# Patient Record
Sex: MALE | Race: WHITE | NOT HISPANIC OR LATINO
[De-identification: names, ages, dates, MRNs, and addresses within clinical notes are randomized per-mention and may not be internally consistent; named-entity substitution may affect disease eponyms.]

---

## 2017-01-17 ENCOUNTER — FORM ENCOUNTER (OUTPATIENT)
Age: 27
End: 2017-01-17

## 2017-01-18 ENCOUNTER — OUTPATIENT (OUTPATIENT)
Dept: OUTPATIENT SERVICES | Facility: HOSPITAL | Age: 27
LOS: 1 days | End: 2017-01-18

## 2017-01-18 ENCOUNTER — APPOINTMENT (OUTPATIENT)
Dept: MRI IMAGING | Facility: HOSPITAL | Age: 27
End: 2017-01-18

## 2017-01-18 DIAGNOSIS — Q21.3 TETRALOGY OF FALLOT: ICD-10-CM

## 2017-01-18 DIAGNOSIS — I37.1 NONRHEUMATIC PULMONARY VALVE INSUFFICIENCY: ICD-10-CM

## 2017-01-19 ENCOUNTER — APPOINTMENT (OUTPATIENT)
Dept: PEDIATRIC CARDIOLOGY | Facility: CLINIC | Age: 27
End: 2017-01-19

## 2017-11-14 ENCOUNTER — MEDICATION RENEWAL (OUTPATIENT)
Age: 27
End: 2017-11-14

## 2017-11-14 RX ORDER — AMOXICILLIN 500 MG/1
500 CAPSULE ORAL
Qty: 4 | Refills: 6 | Status: ACTIVE | COMMUNITY
Start: 1900-01-01 | End: 1900-01-01

## 2017-12-19 ENCOUNTER — OUTPATIENT (OUTPATIENT)
Dept: OUTPATIENT SERVICES | Age: 27
LOS: 1 days | Discharge: HOME | End: 2017-12-19

## 2017-12-20 ENCOUNTER — APPOINTMENT (OUTPATIENT)
Dept: PEDIATRIC CARDIOLOGY | Facility: CLINIC | Age: 27
End: 2017-12-20
Payer: COMMERCIAL

## 2017-12-20 VITALS
SYSTOLIC BLOOD PRESSURE: 140 MMHG | BODY MASS INDEX: 37.98 KG/M2 | HEART RATE: 90 BPM | HEIGHT: 68.5 IN | WEIGHT: 253.53 LBS | RESPIRATION RATE: 16 BRPM | OXYGEN SATURATION: 98 % | DIASTOLIC BLOOD PRESSURE: 84 MMHG

## 2017-12-20 PROCEDURE — 93000 ELECTROCARDIOGRAM COMPLETE: CPT | Mod: GC

## 2017-12-20 PROCEDURE — 93325 DOPPLER ECHO COLOR FLOW MAPG: CPT

## 2017-12-20 PROCEDURE — 99215 OFFICE O/P EST HI 40 MIN: CPT | Mod: 25,GC

## 2017-12-20 PROCEDURE — 93320 DOPPLER ECHO COMPLETE: CPT

## 2017-12-20 PROCEDURE — 93303 ECHO TRANSTHORACIC: CPT

## 2018-10-16 ENCOUNTER — FORM ENCOUNTER (OUTPATIENT)
Age: 28
End: 2018-10-16

## 2018-10-17 ENCOUNTER — APPOINTMENT (OUTPATIENT)
Dept: MRI IMAGING | Facility: HOSPITAL | Age: 28
End: 2018-10-17
Payer: COMMERCIAL

## 2018-10-17 ENCOUNTER — OUTPATIENT (OUTPATIENT)
Dept: OUTPATIENT SERVICES | Age: 28
LOS: 1 days | End: 2018-10-17

## 2018-10-17 DIAGNOSIS — Q21.3 TETRALOGY OF FALLOT: ICD-10-CM

## 2018-10-17 PROCEDURE — 75565 CARD MRI VELOC FLOW MAPPING: CPT | Mod: 26

## 2018-10-17 PROCEDURE — 71555 MRI ANGIO CHEST W OR W/O DYE: CPT | Mod: 26

## 2018-10-17 PROCEDURE — 75561 CARDIAC MRI FOR MORPH W/DYE: CPT | Mod: 26

## 2018-12-24 ENCOUNTER — OUTPATIENT (OUTPATIENT)
Dept: OUTPATIENT SERVICES | Age: 28
LOS: 1 days | Discharge: HOME | End: 2018-12-24

## 2018-12-27 ENCOUNTER — APPOINTMENT (OUTPATIENT)
Dept: PEDIATRIC CARDIOLOGY | Facility: CLINIC | Age: 28
End: 2018-12-27
Payer: COMMERCIAL

## 2018-12-27 VITALS
DIASTOLIC BLOOD PRESSURE: 81 MMHG | WEIGHT: 254.19 LBS | RESPIRATION RATE: 16 BRPM | OXYGEN SATURATION: 98 % | BODY MASS INDEX: 38.08 KG/M2 | HEIGHT: 68.5 IN | HEART RATE: 84 BPM | SYSTOLIC BLOOD PRESSURE: 134 MMHG

## 2018-12-27 DIAGNOSIS — Q21.3 TETRALOGY OF FALLOT: ICD-10-CM

## 2018-12-27 DIAGNOSIS — E66.9 OBESITY, UNSPECIFIED: ICD-10-CM

## 2018-12-27 PROCEDURE — 99215 OFFICE O/P EST HI 40 MIN: CPT | Mod: GC,25

## 2018-12-27 PROCEDURE — 93000 ELECTROCARDIOGRAM COMPLETE: CPT | Mod: GC

## 2018-12-27 NOTE — HISTORY OF PRESENT ILLNESS
[FreeTextEntry1] : We had the pleasure of seeing Vidal in the Pediatric Cardiology Clinic at [facility] on December 27, 2018 for routine follow up. He was last seen 1 year ago. 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 His history is as follows: on 1989 he had a left thoracotomy and placement of a 5mm modified BT shunt. He then had a cardiac catheterization that confirmed his diagnosis. On 7/17/1990 he was taken to the operating room and upon exploration it was determined that he had an anomalous LAD originating from the RCA and that this would necessitate a RV-PA conduit which was not done at that time due to his age. In 1992 a 16mm RV-PA aortic homograft was placed and in 1995 due to obstruction the homograft was opened and covered with Dacron. In 2001 the Dacron was excised a [place]-Declan tube without a valve was placed. \par \par He had a cardiac MRI on 10/17/2018 that demonstrated RVEDV 174cc, RVEDVi 73.6cc/m2 (z score -0.9), RVESV 63.7cc, RVESVi 26.7cc/m2, normal RV/LV EF, proximal LPA stenosis with QpR:QpL 74%:26%. \par \par His most recent Holter in Dec 2016 demonstrated normal sinus rhythm with rare premature atrial contractions and rare premature ventricular contractions and no symptoms. EST in Jan 2017 did not demonstrate any abnormalities. \par \par Since last seen he has been doing well from a cardiovascular perspective. He denies any chest pain, palpitations, syncope or shortness of breath. He has had no recent hospitalizations or emergency room visits. His review of systems is otherwise negative. He is currently not taking any medications.\par \par \par

## 2018-12-27 NOTE — DISCUSSION/SUMMARY
[Needs SBE Prophylaxis] : [unfilled]  needs bacterial endocarditis prophylaxis. SBE prophylaxis is indicated for dental and invasive ENT procedures. (Circulation. 2007; 116: 3900-0347) [PE + No Restrictions] : [unfilled] may participate in the entire physical education program without restriction, including all varsity competitive sports. [Influenza vaccine is recommended] : Influenza vaccine is recommended [FreeTextEntry1] : In summary, Vidal is a 28 year old man with tetralogy of Fallot with an anomalous RCA arising from LAD s/p RV-PA conduit with revisions due to obstruction, with free pulmonary insufficiency. He remains asymptomatic and surveillance studies continue to demonstrate that he does not meet criteria for placement of pulmonary valve. We will continue to obtain surveillance cMRI, the next one will be obtained in 1-2 years. We once again discussed with Vidal that he will most likely need a transcatheter valve in the pulmonary position sometime in the future and we would like to place it before he becomes symptomatic. We also introduced Vidal to Dr. Simone Harden of the [facility] team with plans on transitioning care to him next year. \par

## 2018-12-27 NOTE — PHYSICAL EXAM
[General Appearance - Alert] : alert [General Appearance - In No Acute Distress] : in no acute distress [General Appearance - Well Nourished] : well nourished [General Appearance - Well Developed] : well developed [General Appearance - Well-Appearing] : well appearing [Appearance Of Head] : the head was normocephalic [Facies] : there were no dysmorphic facial features [Sclera] : the conjunctiva were normal [Outer Ear] : the ears and nose were normal in appearance [Examination Of The Oral Cavity] : mucous membranes were moist and pink [Auscultation Breath Sounds / Voice Sounds] : breath sounds clear to auscultation bilaterally [Normal Chest Appearance] : the chest was normal in appearance [Chest Surgical / Traumatic Scar] : chest incision well healed [Chest Palpation Tender Sternum] : no chest wall tenderness [No Sternal Instability] : no sternal instability [Apical Impulse] : quiet precordium with normal apical impulse [Heart Rate And Rhythm] : normal heart rate and rhythm [Heart Sounds] : normal S1 and S2 [Heart Sounds Gallop] : no gallops [Heart Sounds Pericardial Friction Rub] : no pericardial rub [Heart Sounds Click] : no clicks [Arterial Pulses] : normal upper and lower extremity pulses with no pulse delay [Edema] : no edema [Capillary Refill Test] : normal capillary refill [Systolic] : systolic [III] : a grade 3/6   [LUSB] : LUSB [Harsh] : harsh [Diastolic] : diastolic [II] : a grade 2/4  [LMSB] : LMSB  [Decrescendo] : decrescendo [Early] : early [Bowel Sounds] : normal bowel sounds [Abdomen Soft] : soft [Nondistended] : nondistended [Abdomen Tenderness] : non-tender [Musculoskeletal Exam: Normal Movement Of All Extremities] : normal movements of all extremities [Musculoskeletal - Swelling] : no joint swelling seen [Musculoskeletal - Tenderness] : no joint tenderness was elicited [Nail Clubbing] : no clubbing  or cyanosis of the fingers [Motor Tone] : muscle strength and tone were normal [] : no rash [Skin Lesions] : no lesions [Skin Turgor] : normal turgor [Demonstrated Behavior - Infant Nonreactive To Parents] : interactive [Mood] : mood and affect were appropriate for age [Demonstrated Behavior] : normal behavior [FreeTextEntry1] : overweight

## 2018-12-27 NOTE — REASON FOR VISIT
[Follow-Up] : a follow-up visit for [Pulmonary Valve Insufficiency] : pulmonary valve insufficiency [Patient] : patient [FreeTextEntry3] : S/P TOF Repair,

## 2018-12-27 NOTE — CONSULT LETTER
[Today's Date] : [unfilled] [Name] : Name: [unfilled] [] : : ~~ [Today's Date:] : [unfilled] [Dear  ___:] : Dear Dr. [unfilled]: [Consult] : I had the pleasure of evaluating your patient, [unfilled]. My full evaluation follows. [Sincerely,] : Sincerely, [Consult - Multiple Provider] : Thank you very much for allowing us to participate in the care of this patient. If you have any questions, please do not hesitate to contact us. [FreeTextEntry4] : Car Meng MD [FreeTextEntry5] : [address] [FreeTextEntry6] : [place] [address] [de-identified] : EDMUNDO LewisS\par Pediatric Cardiology Fellow\par [facility]\par \par Alberto Coe MD\par Director, Pediatric catheterization Lab\par [facility]\par , [facility]\par Telephone: [phone]\par [phone]\par

## 2018-12-27 NOTE — CARDIOLOGY SUMMARY
[Today's Date] : [unfilled] [FreeTextEntry1] : NSR at a rate of 80bpm with RBBB. QRS 122msec. [de-identified] : 10/17/18 [de-identified] : RVEDV 174cc, RVEDVi 73.6cc/m2 (z score -0.9), RVESV 63.7cc, RVESVi 26.7cc/m2, normal RV/LV EF, proximal LPA stenosis with QpR:QpL 74%:26%. \par

## 2019-01-14 ENCOUNTER — TRANSCRIPTION ENCOUNTER (OUTPATIENT)
Age: 29
End: 2019-01-14

## 2019-10-16 ENCOUNTER — APPOINTMENT (OUTPATIENT)
Dept: PEDIATRIC CARDIOLOGY | Facility: CLINIC | Age: 29
End: 2019-10-16

## 2019-10-22 PROBLEM — Q24.5 ANOMALOUS LEFT CORONARY ARTERY: Status: ACTIVE | Noted: 2019-10-22

## 2019-10-25 ENCOUNTER — APPOINTMENT (OUTPATIENT)
Dept: PEDIATRIC CARDIOLOGY | Facility: CLINIC | Age: 29
End: 2019-10-25
Payer: COMMERCIAL

## 2019-10-25 ENCOUNTER — APPOINTMENT (OUTPATIENT)
Age: 29
End: 2019-10-25

## 2019-10-25 VITALS
SYSTOLIC BLOOD PRESSURE: 122 MMHG | WEIGHT: 265.88 LBS | RESPIRATION RATE: 16 BRPM | DIASTOLIC BLOOD PRESSURE: 81 MMHG | HEIGHT: 68.11 IN | OXYGEN SATURATION: 98 % | BODY MASS INDEX: 40.3 KG/M2 | HEART RATE: 82 BPM

## 2019-10-25 DIAGNOSIS — Q24.5 MALFORMATION OF CORONARY VESSELS: ICD-10-CM

## 2019-10-25 PROCEDURE — 93320 DOPPLER ECHO COMPLETE: CPT

## 2019-10-25 PROCEDURE — 93303 ECHO TRANSTHORACIC: CPT

## 2019-10-25 PROCEDURE — 93325 DOPPLER ECHO COLOR FLOW MAPG: CPT

## 2019-10-29 NOTE — CARDIOLOGY SUMMARY
[Today's Date] : [unfilled] [FreeTextEntry1] : NSR, ventricular rate 82 bpm. RBBB [FreeTextEntry2] : 1. Tetralogy of Fallot, status post repair.\par 2. Status post placement of non-valved RV to Pulmonary artery conduit. The conduit appears calcified.\par 3. Patent RV-PA conduit with PSIG of 31 mmHg (mean gradient ~17mmHg) and free pulmonary\par insufficiency. This represents no significant interval change.\par 4. Moderately dilated right ventricle.\par 5. Paradoxical septal motion of interventricular septum.\par 6. The left anterior descending coronary artery originated from the right coronary artery and coursed\par anterior to the right ventricular outflow tract (by history).\par 7. Acceleration of right pulmonary artery flow velocity and acceleration of left pulmonary artery flow\par velocity.\par 8. Normal left ventricular size, morphology and systolic function.\par 9. No evidence of aortic valve regurgitation.\par 10. No pericardial effusion.\par 11. Markedly limited imaging quality due to patient's obesity

## 2019-10-29 NOTE — DISCUSSION/SUMMARY
[Needs SBE Prophylaxis] : [unfilled]  needs bacterial endocarditis prophylaxis. SBE prophylaxis is indicated for dental and invasive ENT procedures. (Circulation. 2007; 116: 5243-2448) [Influenza vaccine is recommended] : Influenza vaccine is recommended [FreeTextEntry1] : In summary, Vidal is a 29 year old male with tetralogy of Fallot with an anomalous LAD arising from the RCA, s/p RV-PA conduit with revisions due to obstruction and eventual RV-PA non valved [place]-Declan graft and excision of native pulmonary valve.\par \par His echocardiogram today is essentially unchanged from last years study and he remains completely asymptomatic. Since this was our first visit with him we took the time to evaluate his understanding of his congenital heart disease and the long term sequelae associated with his particular defect. We reviewed the importance of meticulous dental hygiene and the need for regular dental cleanings and discussed our rationale for the recommendation of an annual flu shot. We reviewed the importance of a heart healthy diet and discussed that we would like to see him lose weight over the next year as that would decrease his overall risk of complications at the time of pulmonary valve replacement and decrease the risk associated with the metabolic syndrome. We did place a Holter today and if that is jose,l then we would recommend a stress test and repeat cMRI next year prior to his visit next year. If his Holter demonstrates any arrhythmia, then we will adjust the timeline and obtain these studies earlier.\par \par His wife is currently 9 weeks pregnant. We reviewed that the absolute risk of congenital heart disease in babies born to fathers with congenital heart disease is 3 to 4% (compared to 0.8% in the general population).  As a result, we would recommend a fetal echocardiogram between 20 - 22 weeks gestation. We have asked her to contact us next week and we will help arrange for her to have a fetal echocardiogram with one of our pediatric cardiologists. She is planning to deliver at [facility]. \par \par Thank you for allowing us the opportunity to assume care of this pleasant young man.

## 2019-10-29 NOTE — HISTORY OF PRESENT ILLNESS
[FreeTextEntry1] : We had the pleasure of seeing KARTIK CISNEROS for evaluation today in the Adult Congenital Heart Program at [facility]. This is his first transition visit here with us today. He has previously been followed by Dr. Coe. Kartik is a 29 year old male with tetralogy of Fallot and anomalous LAD arising from the RCA. His surgical/imaging history is summarized below:\par \par 1. 1989, Left modified BT shunt. Dr. Sheppard - [facility]\par 2. 7/17/1990, Median sternotomy and exploration revealing anomalous LAD from the RCA coursing the RVOT. Dr. Silver WILSON\par 3. 1992, TOF repair RV to PA conduit (16mm aortic homograft). Dr. Silver WILSON\par 4. 5/11/1995, Patch relief of right ventricular outflow tract obstruction, pulmonary valvotomy. Dr. Silver WILSON\par 5. 4/11/2001, Relief of right ventricular outflow tract obstruction and excision of native pulmonary valve. Dr. Silver WILSON\par 6. 10/17/2018, cMRI RVEDV 174cc, RVEDVi 73.6cc/m2 (z score -0.9), RVESV 63.7cc, RVESVi 26.7cc/m2, normal RV/LV EF, proximal LPA stenosis with QpR:QpL 74%:26%.\par \par He was last seen by Dr. Coe in December 2018. He previously underwent cMRI in October 2018 that demonstrated he does not yet meet criteria for a pulmonary valve. His cardiovascular interim history is unremarkable. Specifically, there are no complaints of chest pain, palpitations, shortness of breath, peripheral edema, dizziness or syncope.\par \par He does not participate in any structured exercise program. He sees a dentist regularly and takes prophylactic antibiotics prior to any invasive dental work. He does not get an annual flu shot. He works as an  at various race [employer] in the area. \par \par He was accompanied by his wife today who informs us she is 9 weeks pregnant.

## 2019-10-29 NOTE — REVIEW OF SYSTEMS
[Feeling Poorly] : not feeling poorly (malaise) [Cyanosis] : no cyanosis [Edema] : no edema [Diaphoresis] : not diaphoretic [Chest Pain] : no chest pain or discomfort [Exercise Intolerance] : no persistence of exercise intolerance [Palpitations] : no palpitations [Orthopnea] : no orthopnea [Fast HR] : no tachycardia [Tachypnea] : not tachypneic [Cough] : no cough [Shortness Of Breath] : not expressed as feeling short of breath [Decrease In Appetite] : appetite not decreased [Fainting (Syncope)] : no fainting [Headache] : no headache [Dizziness] : no dizziness [Wound problems] : no wound problems [Easy Bruising] : no tendency for easy bruising [Easy Bleeding] : no ~M tendency for easy bleeding [Sleep Disturbances] : ~T no sleep disturbances [Depression] : no depression [Anxiety] : no anxiety

## 2019-10-29 NOTE — PHYSICAL EXAM
[General Appearance - Alert] : alert [Facies] : the head and face were normal in appearance [Sclera] : the conjunctiva were normal [Examination Of The Oral Cavity] : mucous membranes were moist and pink [Auscultation Breath Sounds / Voice Sounds] : breath sounds clear to auscultation bilaterally [Chest Visual Inspection Thoracic Deformity] : no chest wall deformity [Sternotomy] : sternotomy [Left Thoracotomy] : left thoracotomy [Well-Healed] : well-healed [Unremarkable] : unremarkable [Apical Impulse] : quiet precordium with normal apical impulse [Heart Rate And Rhythm] : normal heart rate and rhythm [Arterial Pulses] : normal upper and lower extremity pulses with no pulse delay [Edema] : no edema [Normal S1] : normal  [S2 Single] : was single [Systolic] : systolic [III] : a grade 3/6   [LUSB] : LUSB [Ejection] : ejection [Harsh] : harsh [Diastolic] : diastolic [II] : a grade 2/4  [LMSB] : LMSB  [Nondistended] : nondistended [] : no hepato-splenomegaly [Nail Clubbing] : no clubbing  or cyanosis of the fingers [Musculoskeletal Exam: Normal Movement Of All Extremities] : normal movements of all extremities [Abnormal Walk] : normal gait [Skin Turgor] : normal turgor [Demonstrated Behavior - Infant Nonreactive To Parents] : interactive [FreeTextEntry1] : glasses [Normal] : abnormal

## 2019-10-29 NOTE — CONSULT LETTER
[Today's Date] : [unfilled] [Name] : Name: [unfilled] [] : : ~~ [Today's Date:] : [unfilled] [Dear  ___:] : Dear Dr. [unfilled]: [Consult - Multiple Provider] : Thank you very much for allowing us to participate in the care of this patient. If you have any questions, please do not hesitate to contact us. [FreeTextEntry4] : Car Meng MD \par [address] \par [place] [id number] [de-identified] : Nayeli Christine, MSN, CPNP-AC, PC\par Pediatric Cardiology, Adult Congenital Cardiology\par [facility]\par \par Simone Harden MD\par Pediatric Cardiology\par Adult Congenital Heart Disease\par  of Pediatrics\par [employer] [facility]\par

## 2019-10-29 NOTE — REASON FOR VISIT
[Initial Consultation] : an initial consultation for [Tetralogy Of Fallot] : Tetralogy of Fallot [Patient] : patient [FreeTextEntry1] : anomalous LAD from RCA [FreeTextEntry3] : s/p TOF repair with RV to PA conduit, excision of native pulmonary valve

## 2019-11-25 ENCOUNTER — RESULT REVIEW (OUTPATIENT)
Age: 29
End: 2019-11-25

## 2020-05-19 ENCOUNTER — OUTPATIENT (OUTPATIENT)
Dept: OUTPATIENT SERVICES | Facility: HOSPITAL | Age: 30
LOS: 1 days | End: 2020-05-19

## 2020-05-20 LAB — SARS-COV-2 RNA SPEC QL NAA+PROBE: SIGNIFICANT CHANGE UP
